# Patient Record
Sex: FEMALE | Race: WHITE
[De-identification: names, ages, dates, MRNs, and addresses within clinical notes are randomized per-mention and may not be internally consistent; named-entity substitution may affect disease eponyms.]

---

## 2020-03-12 ENCOUNTER — HOSPITAL ENCOUNTER (OUTPATIENT)
Dept: HOSPITAL 19 - COL.RAD | Age: 45
End: 2020-03-12
Attending: ORTHOPAEDIC SURGERY
Payer: COMMERCIAL

## 2020-03-12 DIAGNOSIS — M25.531: Primary | ICD-10-CM

## 2020-09-18 ENCOUNTER — HOSPITAL ENCOUNTER (OUTPATIENT)
Dept: HOSPITAL 19 - MC.RAD | Age: 45
End: 2020-09-18
Payer: COMMERCIAL

## 2020-09-18 DIAGNOSIS — Z12.31: Primary | ICD-10-CM

## 2021-06-09 ENCOUNTER — HOSPITAL ENCOUNTER (OUTPATIENT)
Dept: HOSPITAL 19 - LDRO | Age: 46
Discharge: HOME | End: 2021-06-09
Attending: OBSTETRICS & GYNECOLOGY
Payer: COMMERCIAL

## 2021-06-09 VITALS — TEMPERATURE: 98.1 F | HEART RATE: 76 BPM | SYSTOLIC BLOOD PRESSURE: 119 MMHG | DIASTOLIC BLOOD PRESSURE: 82 MMHG

## 2021-06-09 VITALS — HEIGHT: 65 IN | BODY MASS INDEX: 37.06 KG/M2 | WEIGHT: 222.45 LBS

## 2021-06-09 DIAGNOSIS — O36.8330: Primary | ICD-10-CM

## 2021-06-09 DIAGNOSIS — Z3A.40: ICD-10-CM

## 2021-06-16 ENCOUNTER — HOSPITAL ENCOUNTER (INPATIENT)
Dept: HOSPITAL 19 - OB | Age: 46
LOS: 1 days | Discharge: HOME | End: 2021-06-17
Attending: OBSTETRICS & GYNECOLOGY | Admitting: OBSTETRICS & GYNECOLOGY
Payer: COMMERCIAL

## 2021-06-16 VITALS — DIASTOLIC BLOOD PRESSURE: 72 MMHG | SYSTOLIC BLOOD PRESSURE: 123 MMHG

## 2021-06-16 VITALS — SYSTOLIC BLOOD PRESSURE: 120 MMHG | DIASTOLIC BLOOD PRESSURE: 64 MMHG | HEART RATE: 89 BPM

## 2021-06-16 VITALS — HEART RATE: 99 BPM | SYSTOLIC BLOOD PRESSURE: 128 MMHG | DIASTOLIC BLOOD PRESSURE: 72 MMHG

## 2021-06-16 VITALS — DIASTOLIC BLOOD PRESSURE: 72 MMHG | SYSTOLIC BLOOD PRESSURE: 116 MMHG | HEART RATE: 64 BPM

## 2021-06-16 VITALS — HEART RATE: 79 BPM | SYSTOLIC BLOOD PRESSURE: 155 MMHG | DIASTOLIC BLOOD PRESSURE: 79 MMHG

## 2021-06-16 VITALS — HEART RATE: 94 BPM | SYSTOLIC BLOOD PRESSURE: 135 MMHG | DIASTOLIC BLOOD PRESSURE: 73 MMHG

## 2021-06-16 VITALS — SYSTOLIC BLOOD PRESSURE: 144 MMHG | HEART RATE: 81 BPM | TEMPERATURE: 97.6 F | DIASTOLIC BLOOD PRESSURE: 77 MMHG

## 2021-06-16 VITALS — SYSTOLIC BLOOD PRESSURE: 115 MMHG | DIASTOLIC BLOOD PRESSURE: 66 MMHG | HEART RATE: 61 BPM

## 2021-06-16 VITALS — SYSTOLIC BLOOD PRESSURE: 131 MMHG | DIASTOLIC BLOOD PRESSURE: 70 MMHG | HEART RATE: 106 BPM

## 2021-06-16 VITALS — SYSTOLIC BLOOD PRESSURE: 136 MMHG | DIASTOLIC BLOOD PRESSURE: 75 MMHG | HEART RATE: 71 BPM | TEMPERATURE: 98.4 F

## 2021-06-16 VITALS — DIASTOLIC BLOOD PRESSURE: 69 MMHG | TEMPERATURE: 97.7 F | SYSTOLIC BLOOD PRESSURE: 111 MMHG | HEART RATE: 66 BPM

## 2021-06-16 VITALS — DIASTOLIC BLOOD PRESSURE: 73 MMHG | SYSTOLIC BLOOD PRESSURE: 137 MMHG | HEART RATE: 96 BPM

## 2021-06-16 VITALS — HEART RATE: 90 BPM | DIASTOLIC BLOOD PRESSURE: 93 MMHG | TEMPERATURE: 97.9 F | SYSTOLIC BLOOD PRESSURE: 137 MMHG

## 2021-06-16 VITALS — DIASTOLIC BLOOD PRESSURE: 60 MMHG | SYSTOLIC BLOOD PRESSURE: 111 MMHG | HEART RATE: 75 BPM

## 2021-06-16 VITALS — SYSTOLIC BLOOD PRESSURE: 122 MMHG | HEART RATE: 66 BPM | DIASTOLIC BLOOD PRESSURE: 72 MMHG | TEMPERATURE: 97.6 F

## 2021-06-16 VITALS — HEART RATE: 78 BPM | SYSTOLIC BLOOD PRESSURE: 141 MMHG | DIASTOLIC BLOOD PRESSURE: 89 MMHG

## 2021-06-16 VITALS — BODY MASS INDEX: 36.73 KG/M2 | HEIGHT: 65 IN | WEIGHT: 220.46 LBS

## 2021-06-16 DIAGNOSIS — Z3A.39: ICD-10-CM

## 2021-06-16 DIAGNOSIS — Z86.16: ICD-10-CM

## 2021-06-16 LAB
BASOPHILS # BLD: 0 10*3/UL (ref 0–0.2)
BASOPHILS NFR BLD AUTO: 0.4 % (ref 0–2)
EOSINOPHIL # BLD: 0.1 10*3/UL (ref 0–0.7)
EOSINOPHIL NFR BLD: 1.2 % (ref 0–4)
ERYTHROCYTE [DISTWIDTH] IN BLOOD BY AUTOMATED COUNT: 12.6 % (ref 11.5–14.5)
GRANULOCYTES # BLD AUTO: 74.3 % (ref 42.2–75.2)
HCT VFR BLD AUTO: 36.4 % (ref 37–47)
HGB BLD-MCNC: 12.3 G/DL (ref 12.5–16)
LYMPHOCYTES # BLD: 1.6 10*3/UL (ref 1.2–3.4)
LYMPHOCYTES NFR BLD: 14.3 % (ref 20–51)
MCH RBC QN AUTO: 30 PG (ref 27–31)
MCHC RBC AUTO-ENTMCNC: 34 G/DL (ref 33–37)
MCV RBC AUTO: 88 FL (ref 80–100)
MONOCYTES # BLD: 1 10*3/UL (ref 0.1–0.6)
MONOCYTES NFR BLD AUTO: 9.2 % (ref 1.7–9.3)
NEUTROPHILS # BLD: 8.1 10*3/UL (ref 1.4–6.5)
PLATELET # BLD AUTO: 271 K/MM3 (ref 130–400)
PMV BLD AUTO: 11.7 FL (ref 7.4–10.4)
RBC # BLD AUTO: 4.14 M/MM3 (ref 4.1–5.3)

## 2021-06-16 PROCEDURE — 0UQMXZZ REPAIR VULVA, EXTERNAL APPROACH: ICD-10-PCS | Performed by: OBSTETRICS & GYNECOLOGY

## 2021-06-16 PROCEDURE — 10907ZC DRAINAGE OF AMNIOTIC FLUID, THERAPEUTIC FROM PRODUCTS OF CONCEPTION, VIA NATURAL OR ARTIFICIAL OPENING: ICD-10-PCS | Performed by: OBSTETRICS & GYNECOLOGY

## 2021-06-16 NOTE — NUR
EPIDURAL:
0948: LEONA DURAN AT BS
0950: TO; PT. STITING UP AT BS SIDE OF BED.
0958: TEST DOSE
 
PT. TOLERATED EPIDURAL PLACEMENT WELL. NO COMPLAINTS. WILL CONTINUE TO MONITOR
PT.

## 2021-06-16 NOTE — NUR
ADMIT NOTE:
PT. ARRIVED TO LDR UNIT AROUND 0620 AND WAS ESCORTED TO HER ROOM FROM NURSE.
SHE WAS INSTRUCTED TO CHANGE INTO GOWN AND THEN PLACED ON MONITORS. VITAL
SIGNS WERE WNL, REACTIVE STRIP, AND NO COMPLAINTS FOR PT. 18G WAS PLACED IN L
LOWER FOREARM, NO EDEMA OR PAIN PER PT. LR WAS HUNG AND FLOWING WIHT NO
COMPLICATIONS.
PT. STATES NO COMPLICATIONS TODAY.
CONSENTS WERE SIGNED, DISCUSSION OF POC WAS UNDERSTOOD AND VERBALIZED.

## 2021-06-16 NOTE — NUR
DELIVERY NOTE:
PT. COMPLETE AT 1049 AND DR. MCCOLLUM CALLED.
DR. MCCOLLUM AND NURSERY AT BS AT 1057 AND CHARGE NURSE CALLED FOR DELIVERY.
PT. PLACED IN FOOT PEDALS AND BED BROKEN DOWN. DR. MCCOLLUM AT [facility]. PT.
INSTRUCTED ON PUSHING AND PUSHED WITH CTX x2 AND DELIVERED VIABLE FEMALE
INFANT W/ NUCHAL CORD X1.
DR. MCCOLLUM REMAINS AT [facility] FOR LACERATION REPAIR AND CORD BLOOD SENT
OFF FOR INFANT.
DR. MCCOLLUM FINISHES REPAIR AND LEAVES BS WITH STABLE PT.
PT. ENTERS RECOVERY PERIOD. WILL CONTINUE TO MONITOR PT. STATUS

## 2021-06-16 NOTE — NUR
SUSU responded to consult. The patient notified hospital staff that she is
giving the baby up for adoption and doing a parking lot adoption. SUSU met with
the patient alone. The patient confirms that she is giving the baby up for
adoption to her brother and sister-in-law. She states that she has no
questions or concerns for SW. She confirms that she will be doing the parking
lot adoption. She will discharge with baby and then give the baby to her
brother and sister-in-law. SUSU updated the OB charge nurse. No additional needs
at this time.

## 2021-06-17 VITALS — TEMPERATURE: 97.7 F | SYSTOLIC BLOOD PRESSURE: 144 MMHG | HEART RATE: 72 BPM | DIASTOLIC BLOOD PRESSURE: 85 MMHG

## 2021-06-17 VITALS — DIASTOLIC BLOOD PRESSURE: 88 MMHG | HEART RATE: 76 BPM | SYSTOLIC BLOOD PRESSURE: 149 MMHG | TEMPERATURE: 97.8 F

## 2021-06-17 VITALS — HEART RATE: 70 BPM | SYSTOLIC BLOOD PRESSURE: 122 MMHG | DIASTOLIC BLOOD PRESSURE: 53 MMHG | TEMPERATURE: 97.9 F

## 2021-06-17 VITALS — HEART RATE: 76 BPM | SYSTOLIC BLOOD PRESSURE: 116 MMHG | DIASTOLIC BLOOD PRESSURE: 72 MMHG

## 2021-06-17 NOTE — NUR
SUSU responded to consult. The patient notified hospital staff that she is
giving the baby up for adoption. SW met with the patient alone. The patient
confirms that she is giving the baby up for adoption to her brother and
sister-in-law. The patient verbalized that she will discharge with baby and
then pursue with the adoption after discharge. She had no other questions for
concerns for SW. SUSU updated the OB charge nurse. No additional needs at this
time.